# Patient Record
(demographics unavailable — no encounter records)

---

## 2024-10-17 NOTE — HISTORY OF PRESENT ILLNESS
[Home] : at home, [unfilled] , at the time of the visit. [Medical Office: ([facility])___] : at the medical office located in  [Verbal consent obtained from patient] : the patient, [unfilled] [de-identified] : 53 y/o M PMHx DM (Jardiance,Trulicity), HTN (Lisinopril) , Anxiety (Celexa), HLD (Lipitor) presents via telehealth for med refill. Has not been here for > 1 year. Ran out of Lisinopril few weeks ago BP high.